# Patient Record
Sex: MALE | URBAN - NONMETROPOLITAN AREA
[De-identification: names, ages, dates, MRNs, and addresses within clinical notes are randomized per-mention and may not be internally consistent; named-entity substitution may affect disease eponyms.]

---

## 2024-01-10 ENCOUNTER — TELEPHONE (OUTPATIENT)
Dept: SLEEP CENTER | Age: 54
End: 2024-01-10

## 2024-01-10 NOTE — TELEPHONE ENCOUNTER
Left voicemail with Mr. Frank Lcaey about his HST performed  12/19/2023 and 12/20/2023.  The HST on 12/19/2023 revealed an AHI of 35.8 and the study on 12/20 revealed and index of 42.2 .  An AHI >30 is considered to be within the severe range.    The interpreting physician wrote orders for an APAP with a minimum pressure of 8cm/H2O and a maximum pressure of 20cm/H2O.  Orders, documentation and insurance information was sent to [facility] [employer] [facility] [facility].  Results were routed to the ordering provider, Ponce Grubbs M.D.